# Patient Record
Sex: MALE | Race: BLACK OR AFRICAN AMERICAN | NOT HISPANIC OR LATINO | Employment: FULL TIME | URBAN - NONMETROPOLITAN AREA
[De-identification: names, ages, dates, MRNs, and addresses within clinical notes are randomized per-mention and may not be internally consistent; named-entity substitution may affect disease eponyms.]

---

## 2022-04-14 ENCOUNTER — OFFICE VISIT (OUTPATIENT)
Dept: FAMILY MEDICINE | Facility: CLINIC | Age: 32
End: 2022-04-14

## 2022-04-14 VITALS
OXYGEN SATURATION: 99 % | HEIGHT: 65 IN | WEIGHT: 225.38 LBS | HEART RATE: 68 BPM | DIASTOLIC BLOOD PRESSURE: 74 MMHG | BODY MASS INDEX: 37.55 KG/M2 | TEMPERATURE: 99 F | RESPIRATION RATE: 20 BRPM | SYSTOLIC BLOOD PRESSURE: 126 MMHG

## 2022-04-14 DIAGNOSIS — Z02.4 ENCOUNTER FOR COMMERCIAL DRIVING LICENSE (CDL) EXAM: Primary | ICD-10-CM

## 2022-04-14 PROCEDURE — 99499 NO LOS: ICD-10-PCS | Mod: ,,, | Performed by: NURSE PRACTITIONER

## 2022-04-14 PROCEDURE — 99499 UNLISTED E&M SERVICE: CPT | Mod: ,,, | Performed by: NURSE PRACTITIONER

## 2022-04-14 NOTE — PROGRESS NOTES
"   [facility] - FAMILY MEDICINE       PATIENT NAME: Ramez Camarena   : 1990    AGE: 32 y.o. 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  A comprehensive review of systems was negative  Review of patient's allergies indicates:  No Known Allergies     Med List:  No current outpatient medications on file prior to visit.     No current facility-administered medications on file prior to visit.       Medical/Social/Family History:  History reviewed. No pertinent past medical history.   Social History     Tobacco Use   Smoking Status Current Every Day Smoker    Packs/day: 0.50    Types: Cigarettes   Smokeless Tobacco Former User    Types: Chew      Social History     Substance and Sexual Activity   Alcohol Use Yes    Alcohol/week: 12.0 standard drinks    Types: 12 Cans of beer per week       History reviewed. No pertinent family history.   History reviewed. No pertinent surgical history.     Objective:      Vitals:    22 1108   BP: 126/74   BP Location: Left arm   Patient Position: Sitting   BP Method: Large (Automatic)   Pulse: 68   Resp: 20   Temp: 99.3 °F (37.4 °C)   TempSrc: Oral   SpO2: 99%   Weight: 102.2 kg (225 lb 6.4 oz)   Height: 5' 5" (1.651 m)     Body mass index is 37.51 kg/m².     Physical Exam:    General: well developed, well nourished    Head: normocephalic, atraumatic    Eyes: conjunctivae/corneas clear. PERRL.  Uncorrected - OD:20/20 OS:20/20 OU:20/20  Horizontal field of vision: 90 degrees bilat     Ears:   TM normal bilat  Whisper test > 5 feet bilat    Mouth: mucous membranes moist, pharynx normal without lesions Uvula is midline.     Neck: supple, symmetrical, trachea midline, no adenopathy and " thyroid: not enlarged, symmetric, no tenderness/mass/nodules    Respiratory: unlabored respirations, no intercostal retractions or accessory muscle use, clear to auscultation without rales or wheezes    Cardiovascular: normal rate and regular rhythm, S1 and S2 normal, no murmurs noted    Abdomen: soft, nontender    Musculoskeletal: negative; full range of motion, no tenderness, palpable spasm or pain on motion    Lymphadenopathy: No cervical or supraclavicular adenopathy    Neurological: normal without focal findings and mental status, speech normal, alert and oriented x3    Skin: Skin color, texture, turgor normal. 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      SEE SCANNED DOCUMENTS FOR DOT FORM    Return to clinic as needed.    Signature: LEXY Khan